# Patient Record
Sex: MALE | URBAN - METROPOLITAN AREA
[De-identification: names, ages, dates, MRNs, and addresses within clinical notes are randomized per-mention and may not be internally consistent; named-entity substitution may affect disease eponyms.]

---

## 2021-04-26 ENCOUNTER — APPOINTMENT (RX ONLY)
Dept: URBAN - METROPOLITAN AREA CLINIC 43 | Facility: CLINIC | Age: 46
Setting detail: DERMATOLOGY
End: 2021-04-26

## 2021-04-26 DIAGNOSIS — L40.0 PSORIASIS VULGARIS: ICD-10-CM

## 2021-04-26 PROCEDURE — ? TREATMENT REGIMEN

## 2021-04-26 PROCEDURE — ? ADDITIONAL NOTES

## 2021-04-26 PROCEDURE — ? COUNSELING

## 2021-04-26 PROCEDURE — ? PRESCRIPTION

## 2021-04-26 PROCEDURE — 99204 OFFICE O/P NEW MOD 45 MIN: CPT

## 2021-04-26 RX ORDER — HYDROCORTISONE 25 MG/G
CREAM TOPICAL BID
Qty: 1 | Refills: 2 | Status: ERX | COMMUNITY
Start: 2021-04-26

## 2021-04-26 RX ORDER — KETOCONAZOLE 20 MG/ML
SHAMPOO, SUSPENSION TOPICAL QD
Qty: 1 | Refills: 3 | Status: ERX | COMMUNITY
Start: 2021-04-26

## 2021-04-26 RX ORDER — HALOBETASOL PROPIONATE 0.5 MG/G
CREAM TOPICAL BID
Qty: 1 | Refills: 2 | Status: ERX | COMMUNITY
Start: 2021-04-26

## 2021-04-26 RX ORDER — CLOBETASOL PROPIONATE 0.5 MG/ML
SOLUTION TOPICAL BID
Qty: 1 | Refills: 2 | Status: ERX | COMMUNITY
Start: 2021-04-26

## 2021-04-26 RX ADMIN — HYDROCORTISONE: 25 CREAM TOPICAL at 00:00

## 2021-04-26 RX ADMIN — KETOCONAZOLE: 20 SHAMPOO, SUSPENSION TOPICAL at 00:00

## 2021-04-26 RX ADMIN — CLOBETASOL PROPIONATE: 0.5 SOLUTION TOPICAL at 00:00

## 2021-04-26 RX ADMIN — HALOBETASOL PROPIONATE: 0.5 CREAM TOPICAL at 00:00

## 2021-04-26 ASSESSMENT — LOCATION ZONE DERM
LOCATION ZONE: FACE
LOCATION ZONE: ARM
LOCATION ZONE: SCALP

## 2021-04-26 ASSESSMENT — LOCATION SIMPLE DESCRIPTION DERM
LOCATION SIMPLE: HAIR
LOCATION SIMPLE: LEFT EYEBROW
LOCATION SIMPLE: RIGHT ELBOW
LOCATION SIMPLE: LEFT ELBOW

## 2021-04-26 ASSESSMENT — LOCATION DETAILED DESCRIPTION DERM
LOCATION DETAILED: RIGHT ELBOW
LOCATION DETAILED: LEFT ELBOW
LOCATION DETAILED: HAIR
LOCATION DETAILED: LEFT MEDIAL EYEBROW

## 2021-04-26 NOTE — PROCEDURE: TREATMENT REGIMEN
Action 4: Continue
Start Regimen: halobetasol propionate 0.05 % topical cream BID x 3 weeks to affected areas on body\\n\\nhydrocortisone 2.5 % topical cream BID on face x 2 weeks \\n\\nClobetasol sol BID for scalp x 2 weeks \\n\\nKetoconazole shampoo on scalp
Detail Level: Zone

## 2021-04-26 NOTE — PROCEDURE: MIPS QUALITY
Detail Level: Detailed
Quality 110: Preventive Care And Screening: Influenza Immunization: Influenza Immunization previously received during influenza season
Additional Notes: Have you tested positive for COVID19 or been diagnosed with COVID19? No\\nAre you waiting for pending test results for COVID19? No\\nDo you have any of the following symptoms: fever, cough, difficulty breathing, muscle aches/soreness, loss of smell or taste, sore throat, or nausea/diarrhea? No\\nHave you been in close contact with anyone who has COVID19? No\\nHave you traveled outside of the [place] or to [place], [place], [place], [place], [place], [place], or [place] in the last 14 days? No\\nIs there any reason you cannot wear a mask that covers your nose and mouth for the entire time that you are in the office? No

## 2021-04-26 NOTE — PROCEDURE: ADDITIONAL NOTES
Detail Level: Simple
Render Risk Assessment In Note?: no
Additional Notes: Psoriasis \\nBSA 3%\\nLocation: Elbows, knees, scalp, forehead \\nPrior treatments: econazole cream from PCP\\n\\nDiscussed nature and chronicity of condition\\nDiscussed treatment options including topicals vs. orals vs. nbuvb vs. biologics\\nr/b/se's of all \\n\\nInitiate regimen:\\nHalobetasol BID for body\\nHCT BID for face\\nClobetasol solution BID for scalp\\nKetoconazole shampoo face and scalp\\n\\nAppropriate use of topical steroids\\nIntensive moisturizer\\n\\nf/u 10 days pt is moving to [place]

## 2021-05-06 ENCOUNTER — APPOINTMENT (RX ONLY)
Dept: URBAN - METROPOLITAN AREA CLINIC 43 | Facility: CLINIC | Age: 46
Setting detail: DERMATOLOGY
End: 2021-05-06

## 2021-05-06 DIAGNOSIS — L40.0 PSORIASIS VULGARIS: ICD-10-CM | Status: IMPROVED

## 2021-05-06 PROCEDURE — 99213 OFFICE O/P EST LOW 20 MIN: CPT

## 2021-05-06 PROCEDURE — ? ADDITIONAL NOTES

## 2021-05-06 PROCEDURE — ? COUNSELING

## 2021-05-06 PROCEDURE — ? TREATMENT REGIMEN

## 2021-05-06 ASSESSMENT — LOCATION SIMPLE DESCRIPTION DERM
LOCATION SIMPLE: LEFT ELBOW
LOCATION SIMPLE: HAIR
LOCATION SIMPLE: RIGHT ELBOW
LOCATION SIMPLE: LEFT EYEBROW

## 2021-05-06 ASSESSMENT — LOCATION DETAILED DESCRIPTION DERM
LOCATION DETAILED: LEFT MEDIAL EYEBROW
LOCATION DETAILED: HAIR
LOCATION DETAILED: RIGHT ELBOW
LOCATION DETAILED: LEFT ELBOW

## 2021-05-06 ASSESSMENT — LOCATION ZONE DERM
LOCATION ZONE: ARM
LOCATION ZONE: SCALP
LOCATION ZONE: FACE

## 2021-05-06 NOTE — PROCEDURE: ADDITIONAL NOTES
Detail Level: Simple
Render Risk Assessment In Note?: no
Additional Notes: Psoriasis \\nBSA 3%\\nLocation: Elbows, knees, scalp, forehead \\nPrior treatments: econazole cream from PCP\\n\\nDiscussed nature and chronicity of condition\\nDiscussed treatment options including topicals vs. orals vs. nbuvb vs. biologics\\nr/b/se's of all \\n\\nMuch improved following 10 days of therapy, patient is moving\\nDiscussed maintenance and appropriate usage of meds\\n\\nContinue regimen:\\nHalobetasol - BID for body prn if raised and scaly patches present\\nHCT BID for face prn during flares\\nClobetasol solution BID for scalp prn during flares\\nKetoconazole shampoo face and scalp prn during flares\\n\\nAppropriate use of topical steroids\\nIntensive moisturizer\\nPt will call if refills needed - pt moving to [place]

## 2021-05-06 NOTE — PROCEDURE: TREATMENT REGIMEN
Action 4: Continue
Continue Regimen: halobetasol propionate 0.05 % topical cream BID x affected areas on body then prn during flares\\n\\nhydrocortisone 2.5 % topical cream BID on face prn during flares\\n\\nClobetasol sol BID for scalp prn during flares\\n\\nKetoconazole shampoo on scalp 2-3x weekly for maintenance
Detail Level: Zone